# Patient Record
Sex: MALE | Race: WHITE | NOT HISPANIC OR LATINO | Employment: FULL TIME | URBAN - METROPOLITAN AREA
[De-identification: names, ages, dates, MRNs, and addresses within clinical notes are randomized per-mention and may not be internally consistent; named-entity substitution may affect disease eponyms.]

---

## 2021-06-12 ENCOUNTER — HOSPITAL ENCOUNTER (EMERGENCY)
Facility: CLINIC | Age: 31
Discharge: HOME | End: 2021-06-12
Attending: EMERGENCY MEDICINE | Admitting: EMERGENCY MEDICINE
Payer: COMMERCIAL

## 2021-06-12 ENCOUNTER — APPOINTMENT (OUTPATIENT)
Dept: MRI IMAGING | Facility: CLINIC | Age: 31
End: 2021-06-12
Attending: EMERGENCY MEDICINE
Payer: COMMERCIAL

## 2021-06-12 VITALS
HEART RATE: 66 BPM | SYSTOLIC BLOOD PRESSURE: 109 MMHG | DIASTOLIC BLOOD PRESSURE: 61 MMHG | TEMPERATURE: 97.6 F | RESPIRATION RATE: 16 BRPM | OXYGEN SATURATION: 97 % | WEIGHT: 180 LBS

## 2021-06-12 DIAGNOSIS — I63.81 LACUNAR INFARCTION (H): ICD-10-CM

## 2021-06-12 DIAGNOSIS — R51.9 ACUTE NONINTRACTABLE HEADACHE, UNSPECIFIED HEADACHE TYPE: ICD-10-CM

## 2021-06-12 DIAGNOSIS — H53.9 VISION CHANGES: ICD-10-CM

## 2021-06-12 LAB
CHOLEST SERPL-MCNC: 129 MG/DL
GLUCOSE BLDC GLUCOMTR-MCNC: 80 MG/DL (ref 70–99)
HBA1C MFR BLD: 5.2 % (ref 0–5.6)
HDLC SERPL-MCNC: 32 MG/DL
LDLC SERPL CALC-MCNC: 67 MG/DL
NONHDLC SERPL-MCNC: 97 MG/DL
TRIGL SERPL-MCNC: 149 MG/DL

## 2021-06-12 PROCEDURE — 250N000013 HC RX MED GY IP 250 OP 250 PS 637: Performed by: EMERGENCY MEDICINE

## 2021-06-12 PROCEDURE — 99285 EMERGENCY DEPT VISIT HI MDM: CPT | Mod: 25

## 2021-06-12 PROCEDURE — 255N000002 HC RX 255 OP 636: Performed by: EMERGENCY MEDICINE

## 2021-06-12 PROCEDURE — 80061 LIPID PANEL: CPT | Performed by: EMERGENCY MEDICINE

## 2021-06-12 PROCEDURE — 250N000011 HC RX IP 250 OP 636: Performed by: EMERGENCY MEDICINE

## 2021-06-12 PROCEDURE — 999N001017 HC STATISTIC GLUCOSE BY METER IP

## 2021-06-12 PROCEDURE — 83036 HEMOGLOBIN GLYCOSYLATED A1C: CPT | Performed by: EMERGENCY MEDICINE

## 2021-06-12 PROCEDURE — A9585 GADOBUTROL INJECTION: HCPCS | Performed by: EMERGENCY MEDICINE

## 2021-06-12 PROCEDURE — 70553 MRI BRAIN STEM W/O & W/DYE: CPT

## 2021-06-12 PROCEDURE — 96374 THER/PROPH/DIAG INJ IV PUSH: CPT | Mod: 59

## 2021-06-12 PROCEDURE — 96375 TX/PRO/DX INJ NEW DRUG ADDON: CPT

## 2021-06-12 PROCEDURE — 70543 MRI ORBT/FAC/NCK W/O &W/DYE: CPT

## 2021-06-12 RX ORDER — KETOROLAC TROMETHAMINE 15 MG/ML
15 INJECTION, SOLUTION INTRAMUSCULAR; INTRAVENOUS ONCE
Status: COMPLETED | OUTPATIENT
Start: 2021-06-12 | End: 2021-06-12

## 2021-06-12 RX ORDER — GADOBUTROL 604.72 MG/ML
10 INJECTION INTRAVENOUS ONCE
Status: COMPLETED | OUTPATIENT
Start: 2021-06-12 | End: 2021-06-12

## 2021-06-12 RX ORDER — DIPHENHYDRAMINE HYDROCHLORIDE 50 MG/ML
50 INJECTION INTRAMUSCULAR; INTRAVENOUS ONCE
Status: COMPLETED | OUTPATIENT
Start: 2021-06-12 | End: 2021-06-12

## 2021-06-12 RX ORDER — OXYCODONE HYDROCHLORIDE 5 MG/1
10 TABLET ORAL ONCE
Status: DISCONTINUED | OUTPATIENT
Start: 2021-06-12 | End: 2021-06-12

## 2021-06-12 RX ORDER — GADOBUTROL 604.72 MG/ML
10 INJECTION INTRAVENOUS ONCE
Status: DISCONTINUED | OUTPATIENT
Start: 2021-06-12 | End: 2021-06-12

## 2021-06-12 RX ORDER — METOCLOPRAMIDE HYDROCHLORIDE 5 MG/ML
10 INJECTION INTRAMUSCULAR; INTRAVENOUS ONCE
Status: COMPLETED | OUTPATIENT
Start: 2021-06-12 | End: 2021-06-12

## 2021-06-12 RX ORDER — ASPIRIN 325 MG
325 TABLET ORAL ONCE
Status: COMPLETED | OUTPATIENT
Start: 2021-06-12 | End: 2021-06-12

## 2021-06-12 RX ADMIN — GADOBUTROL 8 ML: 604.72 INJECTION INTRAVENOUS at 12:53

## 2021-06-12 RX ADMIN — KETOROLAC TROMETHAMINE 15 MG: 15 INJECTION, SOLUTION INTRAMUSCULAR; INTRAVENOUS at 14:56

## 2021-06-12 RX ADMIN — ASPIRIN 325 MG ORAL TABLET 325 MG: 325 PILL ORAL at 14:56

## 2021-06-12 RX ADMIN — METOCLOPRAMIDE HYDROCHLORIDE 10 MG: 5 INJECTION INTRAMUSCULAR; INTRAVENOUS at 14:17

## 2021-06-12 RX ADMIN — DIPHENHYDRAMINE HYDROCHLORIDE 50 MG: 50 INJECTION, SOLUTION INTRAMUSCULAR; INTRAVENOUS at 14:18

## 2021-06-12 ASSESSMENT — ENCOUNTER SYMPTOMS
NUMBNESS: 0
CHILLS: 0
WEAKNESS: 0
HEADACHES: 1
NECK PAIN: 0
PHOTOPHOBIA: 1
FEVER: 0
NAUSEA: 0
VOMITING: 0

## 2021-06-12 NOTE — DISCHARGE INSTRUCTIONS
Take a full 325mg aspirin daily.    Discharge Instructions  Headache    You were seen today for a headache. Headaches may be caused by many different things such as muscle tension, sinus inflammation, anxiety and stress, having too little sleep, too much alcohol, some medical conditions or injury. You may have a migraine, which is caused by changes in the blood vessels in your head.  At this time your provider does not find that your headache is a sign of anything dangerous or life-threatening.  However, sometimes the signs of serious illness do not show up right away.      Generally, every Emergency Department visit should have a follow-up clinic visit with either a primary or a specialty clinic/provider. Please follow-up as instructed by your emergency provider today.    Return to the Emergency Department if:  You get a new fever of 100.4 F or higher.  Your headache gets much worse.  You get a stiff neck with your headache.  You get a new headache that is significantly different or worse than headaches you have had before.  You are vomiting (throwing up) and cannot keep food or water down.  You have blurry or double vision or other problems with your eyes.  You have a new weakness on one side of your body.  You have difficulty with balance which is new.  You or your family thinks you are confused.  You have a seizure.    What can I do to help myself?  Pain medications - You may take a pain medication such as Tylenol  (acetaminophen), Advil , Motrin  (ibuprofen) or Aleve  (naproxen).  Take a pain reliever as soon as you notice symptoms.  Starting medications as soon as you start to have symptoms may lessen the amount of pain you have.  Relaxing in a quiet, dark room may help.  Get enough sleep and eat meals regularly.  You may need to watch for certain foods or other things which may trigger your headaches.  Keeping a journal of your headaches and possible triggers may help you and your primary provider to identify  things which you should avoid which may be causing your headaches.  If you were given a prescription for medicine here today, be sure to read all of the information (including the package insert) that comes with your prescription.  This will include important information about the medicine, its side effects, and any warnings that you need to know about.  The pharmacist who fills the prescription can provide more information and answer questions you may have about the medicine.  If you have questions or concerns that the pharmacist cannot address, please call or return to the Emergency Department.   Remember that you can always come back to the Emergency Department if you are not able to see your regular provider in the amount of time listed above, if you get any new symptoms, or if there is anything that worries you.

## 2021-06-12 NOTE — ED PROVIDER NOTES
History   Chief Complaint:  Headache       HPI   Wes Shahid is a 30 year old male who presents with headache . Symptoms have worsened over the past couple of weeks. He went to the dentist, since pain felt like it originated from his mouth. Notes left eye blurriness this morning and intermittent sensitivity to light. Denies fever, chills, nausea, vomiting, double vision, sensitivity to sounds, tinnitus, hearing loss, numbness, tingling, one sided weakness, and neck stiffness. He believes symptoms are worse when he wakes up. He used 800mg ibuprofen and 1000mg Tylenol with relief and rates his pain a 4. He is adopted, so he is unsure of his family medical history.     Review of Systems   Constitutional: Negative for chills and fever.   HENT: Negative for tinnitus.         Denies hearing loss.   Eyes: Positive for photophobia and visual disturbance.        Denies double vision.   Gastrointestinal: Negative for nausea and vomiting.   Musculoskeletal: Negative for neck pain.   Neurological: Positive for headaches. Negative for weakness and numbness.   All other systems reviewed and are negative.      Allergies:  Tetracycline  Minocycline    Medications:  None     Past Medical History:    Femoral neck fracture   Fibula fracture    Family History:  Adopted, FH unknown    Social History:  PCP: Clinic, [facility]  Presents with wife. Nonsmoker.     Physical Exam     Patient Vitals for the past 24 hrs:   BP Temp Temp src Pulse Resp SpO2 Weight   06/12/21 1410 -- -- -- -- -- 97 % --   06/12/21 1400 128/72 -- -- -- -- -- --   06/12/21 1240 -- -- -- -- -- 99 % --   06/12/21 1230 -- -- -- -- -- 100 % --   06/12/21 1220 101/58 -- -- -- -- -- --   06/12/21 0947 (!) 144/69 97.6  F (36.4  C) Temporal 79 16 99 % 81.6 kg (180 lb)       Physical Exam  General: Adult male sitting upright  Eyes: PERRL, Conjunctive within normal limits. EOMI. peripheral vision intact.  HENT: No scalp or facial or jaw tenderness to  palpation or percussion.  Moist mucous membranes, oropharynx clear.   Neck: No rigidity  CV: Normal S1S2, no murmur, rub or gallop. Regular rate and rhythm  Resp: Clear to auscultation bilaterally, no wheezes, rales or rhonchi. Normal respiratory effort.  MSK: No edema. Nontender. Normal active range of motion.  Skin: Warm and dry. No rashes or lesions or ecchymoses on visible skin.  Neuro: Alert and oriented. Responds appropriately to all questions and commands. No focal findings appreciated. Normal muscle tone.  No facial asymmetry.  Speech is normal.  Psych: Normal mood and affect. Pleasant.    Emergency Department Course     Imaging:  MR Orbit w/o & w Contrast  IMPRESSION: Negative orbit MR without and with contrast.  Reading per radiology    MR Brain w/o & w Contrast  IMPRESSION:  1. Small old lacunar infarct in the right caudate nucleus.  2. No evidence for intracranial hemorrhage, acute infarct, or any  focal mass lesions.  Reading per radiology    Laboratory:  Glucose by meter(1446): 80  Hemoglobin: A1c: pending   Lipid panel reflex to direct LDL: pending     Emergency Department Course:    Reviewed:  I reviewed nursing notes, vitals, past medical history and care everywhere    Assessments:  1125 I obtained history and examined the patient as noted above.   1435 I rechecked the patient and explained findings.     Consults:   1425 : I spoke with Dr. Gramajo of the Stroke Neurology service from [facility] regarding patient's presentation, findings, and plan of care.    Interventions:  1253 Gadavist 8mL IV   1417 Reglan 10mg IV   1418 Benadryl 50mg IV   1456 Toradol 15mg IV   1456 Aspirin 325 mg Oral      Disposition:  The patient was discharged to home.     Impression & Plan     Medical Decision Making:    Wes Shahid is a 30 year old male who presents with a headache associated today with intermittent left eye vision changes and photophobia.  Evaluation in the emergency department has been negative.  "The patient has not had any fever, weakness, numbness, neck stiffness, thunderclap, \"worst ever\" character, seizures, vision changes, trauma, or confusion. There is no evidence of increased ICP, vision changes have resolved and his presentation would be unlikely to be explained by this pathology.  SAH, stroke, cervical artery dissection, intracranial hemorrhage, meningitis/abscess, CVT, tumor, pseudotumor cerebri, pituitary apoplexy, glaucoma, temporal arteritis, CO poisoning are considered as part of the differential, and considered unlikely.  Migraine headache, tension type headache, cluster headache, male B etiologies.  MRI brain was obtained with MRI orbit given concerns for vision changes associated headache.  Incidentally this showed a chronic lunar infarct of which the patient was unaware and not having any knowledge of symptoms might have been associated this.  I discussed his care with stroke neurology who recommended daily aspirin and outpatient follow-up closely with primary care physician.  No indication for admission.  Pain has improved with medication interventions.  The patient should follow-up with primary physician within 3 days.  Outpatient neurology evaluation may be indicated if headaches persist.  If he develops any focal neurologic symptoms as described with him he should return immediate to the emergency department.  I recommended return for worse pain, fever, vomiting, weakness, vision changes, neck stiffness, or any other concerns.  He is recommended daily aspirin.        Diagnosis:    ICD-10-CM    1. Acute nonintractable headache, unspecified headache type  R51.9    2. Vision changes  H53.9     left eye   3. Lacunar infarction (H)  I63.81     chronic         Scribe Disclosure:  I, Magymavictor manuel Trevino, am serving as a scribe at 11:04 AM on 6/12/2021 to document services personally performed by Mona Saab MD based on my observations and the provider's statements to me.        Mona Saab " MD Laura  06/13/21 0718

## 2021-06-12 NOTE — ED TRIAGE NOTES
A&O x4, ABCs intact. Pt presents with headache for the past 2 weeks that is getting worse. Pt reports going to the dentist thinking he had dental pain but they didn't see anything. Pt took 800mg ibuprofen, 1g tylenol and 15mg of hydrocodone around 0800. Pt reports that the hydrocodone is from [place]. He states that helped.

## 2022-10-06 ENCOUNTER — HOSPITAL ENCOUNTER (EMERGENCY)
Facility: CLINIC | Age: 32
Discharge: HOME | End: 2022-10-06
Attending: EMERGENCY MEDICINE | Admitting: EMERGENCY MEDICINE
Payer: COMMERCIAL

## 2022-10-06 ENCOUNTER — APPOINTMENT (OUTPATIENT)
Dept: CT IMAGING | Facility: CLINIC | Age: 32
End: 2022-10-06
Attending: EMERGENCY MEDICINE
Payer: COMMERCIAL

## 2022-10-06 VITALS
DIASTOLIC BLOOD PRESSURE: 72 MMHG | SYSTOLIC BLOOD PRESSURE: 109 MMHG | OXYGEN SATURATION: 99 % | TEMPERATURE: 98.1 F | RESPIRATION RATE: 14 BRPM | HEART RATE: 91 BPM

## 2022-10-06 DIAGNOSIS — R51.9 NONINTRACTABLE HEADACHE, UNSPECIFIED CHRONICITY PATTERN, UNSPECIFIED HEADACHE TYPE: ICD-10-CM

## 2022-10-06 LAB
ANION GAP SERPL CALCULATED.3IONS-SCNC: 8 MMOL/L (ref 7–15)
BASOPHILS # BLD AUTO: 0 10E3/UL (ref 0–0.2)
BASOPHILS NFR BLD AUTO: 0 %
BUN SERPL-MCNC: 12.4 MG/DL (ref 6–20)
CALCIUM SERPL-MCNC: 8.9 MG/DL (ref 8.6–10)
CHLORIDE SERPL-SCNC: 105 MMOL/L (ref 98–107)
CREAT SERPL-MCNC: 0.68 MG/DL (ref 0.67–1.17)
DEPRECATED HCO3 PLAS-SCNC: 27 MMOL/L (ref 22–29)
EOSINOPHIL # BLD AUTO: 0.2 10E3/UL (ref 0–0.7)
EOSINOPHIL NFR BLD AUTO: 1 %
ERYTHROCYTE [DISTWIDTH] IN BLOOD BY AUTOMATED COUNT: 14 % (ref 10–15)
GFR SERPL CREATININE-BSD FRML MDRD: >90 ML/MIN/1.73M2
GLUCOSE SERPL-MCNC: 94 MG/DL (ref 70–99)
HCT VFR BLD AUTO: 44.4 % (ref 40–53)
HGB BLD-MCNC: 13.6 G/DL (ref 13.3–17.7)
IMM GRANULOCYTES # BLD: 0 10E3/UL
IMM GRANULOCYTES NFR BLD: 0 %
LYMPHOCYTES # BLD AUTO: 2.2 10E3/UL (ref 0.8–5.3)
LYMPHOCYTES NFR BLD AUTO: 18 %
MCH RBC QN AUTO: 25.4 PG (ref 26.5–33)
MCHC RBC AUTO-ENTMCNC: 30.6 G/DL (ref 31.5–36.5)
MCV RBC AUTO: 83 FL (ref 78–100)
MONOCYTES # BLD AUTO: 0.9 10E3/UL (ref 0–1.3)
MONOCYTES NFR BLD AUTO: 8 %
NEUTROPHILS # BLD AUTO: 9.1 10E3/UL (ref 1.6–8.3)
NEUTROPHILS NFR BLD AUTO: 73 %
NRBC # BLD AUTO: 0 10E3/UL
NRBC BLD AUTO-RTO: 0 /100
PLATELET # BLD AUTO: 442 10E3/UL (ref 150–450)
POTASSIUM SERPL-SCNC: 4.4 MMOL/L (ref 3.4–5.3)
RBC # BLD AUTO: 5.35 10E6/UL (ref 4.4–5.9)
SODIUM SERPL-SCNC: 140 MMOL/L (ref 136–145)
WBC # BLD AUTO: 12.5 10E3/UL (ref 4–11)

## 2022-10-06 PROCEDURE — 36415 COLL VENOUS BLD VENIPUNCTURE: CPT | Performed by: EMERGENCY MEDICINE

## 2022-10-06 PROCEDURE — 80048 BASIC METABOLIC PNL TOTAL CA: CPT | Performed by: EMERGENCY MEDICINE

## 2022-10-06 PROCEDURE — 85025 COMPLETE CBC W/AUTO DIFF WBC: CPT | Performed by: EMERGENCY MEDICINE

## 2022-10-06 PROCEDURE — 96375 TX/PRO/DX INJ NEW DRUG ADDON: CPT

## 2022-10-06 PROCEDURE — 250N000011 HC RX IP 250 OP 636: Performed by: EMERGENCY MEDICINE

## 2022-10-06 PROCEDURE — 96374 THER/PROPH/DIAG INJ IV PUSH: CPT

## 2022-10-06 PROCEDURE — 70450 CT HEAD/BRAIN W/O DYE: CPT

## 2022-10-06 PROCEDURE — 99285 EMERGENCY DEPT VISIT HI MDM: CPT | Mod: 25

## 2022-10-06 RX ORDER — DEXAMETHASONE SODIUM PHOSPHATE 10 MG/ML
10 INJECTION, SOLUTION INTRAMUSCULAR; INTRAVENOUS ONCE
Status: COMPLETED | OUTPATIENT
Start: 2022-10-06 | End: 2022-10-06

## 2022-10-06 RX ORDER — KETOROLAC TROMETHAMINE 15 MG/ML
15 INJECTION, SOLUTION INTRAMUSCULAR; INTRAVENOUS ONCE
Status: COMPLETED | OUTPATIENT
Start: 2022-10-06 | End: 2022-10-06

## 2022-10-06 RX ORDER — DIPHENHYDRAMINE HYDROCHLORIDE 50 MG/ML
25 INJECTION INTRAMUSCULAR; INTRAVENOUS ONCE
Status: COMPLETED | OUTPATIENT
Start: 2022-10-06 | End: 2022-10-06

## 2022-10-06 RX ADMIN — PROCHLORPERAZINE EDISYLATE 10 MG: 5 INJECTION INTRAMUSCULAR; INTRAVENOUS at 16:24

## 2022-10-06 RX ADMIN — DIPHENHYDRAMINE HYDROCHLORIDE 25 MG: 50 INJECTION, SOLUTION INTRAMUSCULAR; INTRAVENOUS at 16:12

## 2022-10-06 RX ADMIN — KETOROLAC TROMETHAMINE 15 MG: 15 INJECTION, SOLUTION INTRAMUSCULAR; INTRAVENOUS at 16:16

## 2022-10-06 RX ADMIN — DEXAMETHASONE SODIUM PHOSPHATE 10 MG: 10 INJECTION, SOLUTION INTRAMUSCULAR; INTRAVENOUS at 16:18

## 2022-10-06 ASSESSMENT — ENCOUNTER SYMPTOMS
PHOTOPHOBIA: 1
HEADACHES: 1
VOMITING: 1
NAUSEA: 1
NUMBNESS: 0
FEVER: 0
SPEECH DIFFICULTY: 0

## 2022-10-06 ASSESSMENT — ACTIVITIES OF DAILY LIVING (ADL)
ADLS_ACUITY_SCORE: 35
ADLS_ACUITY_SCORE: 35

## 2022-10-06 NOTE — ED PROVIDER NOTES
History     Chief Complaint:  Headache      HPI   Wes Shahid is a 32 year old male who presents with headaches. The patient has had a headache for the past month. He has been to his PCP for this and prescribed medications have not been helping him a lot. He is sensitive to natural light. The headaches usually is in the left frontal region, but radiates to his jaw line and/or the other side of his head when it worsens. He denies fever, but felt nauseous and vomited last night. He denies changes in speech or numbness/tingling. He notes a slight change in vision. He has an upcoming appointment with his neurologist on 10/21/2022.    Review of Systems   Constitutional: Negative for fever.   Eyes: Positive for photophobia and visual disturbance.   Gastrointestinal: Positive for nausea and vomiting.   Neurological: Positive for headaches. Negative for speech difficulty and numbness.   All other systems reviewed and are negative.    Allergies:  Tetracycline  Minocycline  Sumatriptan    Medications:    Metoclopramide    Past Medical History:    migraines    Social History:  Presents with SO    Physical Exam     Patient Vitals for the past 24 hrs:   BP Temp Temp src Pulse Resp SpO2   10/06/22 1630 113/68 -- -- -- 16 98 %   10/06/22 1341 127/86 98.1  F (36.7  C) Oral 91 18 98 %       Physical Exam  Constitutional: Alert, attentive  HENT:    Nose: Nose normal.    Mouth/Throat: Oropharynx is clear, mucous membranes are moist  Eyes: EOM are normal. Pupils are equal, round, and reactive to light.   CV: Regular rate and rhythm, no murmurs, rubs or gallops.  Chest: Effort normal and breath sounds normal.   GI: No distension. There is no tenderness  MSK: Normal range of motion.   Neurological:   A/Ox3;   Cranial nerves 2-12 intact;   5/5 strength throughout the upper and lower extremities;   sensation intact to light touch throughout the upper and lower extremities;   normal gait   No meningismus   Skin: Skin is warm and  dry.        Emergency Department Course     Imaging:  CT Head w/o Contrast   Final Result   IMPRESSION:    No acute intracranial abnormality.      DENZEL FARRELL MD            SYSTEM ID:  [id number]        Report per radiology    Laboratory:  Labs Ordered and Resulted from Time of ED Arrival to Time of ED Departure   CBC WITH PLATELETS AND DIFFERENTIAL - Abnormal       Result Value    WBC Count 12.5 (*)     RBC Count 5.35      Hemoglobin 13.6      Hematocrit 44.4      MCV 83      MCH 25.4 (*)     MCHC 30.6 (*)     RDW 14.0      Platelet Count 442      % Neutrophils 73      % Lymphocytes 18      % Monocytes 8      % Eosinophils 1      % Basophils 0      % Immature Granulocytes 0      NRBCs per 100 WBC 0      Absolute Neutrophils 9.1 (*)     Absolute Lymphocytes 2.2      Absolute Monocytes 0.9      Absolute Eosinophils 0.2      Absolute Basophils 0.0      Absolute Immature Granulocytes 0.0      Absolute NRBCs 0.0     BASIC METABOLIC PANEL - Normal    Sodium 140      Potassium 4.4      Chloride 105      Carbon Dioxide (CO2) 27      Anion Gap 8      Urea Nitrogen 12.4      Creatinine 0.68      Calcium 8.9      Glucose 94      GFR Estimate >90       Emergency Department Course:    Reviewed:  I reviewed nursing notes, vitals, past medical history and Care Everywhere    Assessments:  1345 I obtained history and examined the patient as noted above.   1655 I rechecked the patient and explained findings.     Interventions:  1612 Benadryl 25 mg IV  1616 toradol 15 mg IV  1618 Decadron 10 mg IV  1624 Compazine 10 mg IV    Disposition:  The patient was discharged to home.     Impression & Plan      Medical Decision Making:  This is a 32-year-old male with history of headaches, possibly migraines, who presents for evaluation of severe headache.  Approximately 1.5 years ago, the patient had his refractory headache for which she underwent MRI imaging which showed possible old lacunar infarct but no other acute abnormality.   Subsequently, he has had no significant issues with headaches.  He has a normal neurologic exam, but given change in character of headache in severity, CT was performed.  Imaging is fortunately normal.  Symptoms are improved with supportive cares.  There is no thunderclap aspect or fever to suggest LP at this time.  Plan neurology follow-up and continue supportive cares.  Return precautions for fever, stroke symptoms, or any other concerns.      Diagnosis:    ICD-10-CM    1. Nonintractable headache, unspecified chronicity pattern, unspecified headache type  R51.9          Scribe Disclosure:  Tim DALY, am serving as a scribe at 4:32 PM on 10/6/2022 to document services personally performed by Mane Chapman MD based on my observations and the provider's statements to me.      Mane Chapman MD  10/06/22 [id number]

## 2022-10-06 NOTE — LETTER
October 6, 2022      To Whom It May Concern:      Wes PRASAD Shahid was seen in our Emergency Department today, 10/06/22. Please excuse his absence from work.  He may return to work when improved.    Sincerely,        Jenna STEWART RN

## 2022-10-06 NOTE — ED NOTES
Rapid Assessment Note    History:   Wes Shahid is a 32 year old male who presents with headaches. The patient has had a headache for the past month. He has been to his PCP for this and prescribed medications have not been helping him a lot. He is sensitive to natural light. The headaches usually is in the left frontal region, but radiates to his jaw line and/or the other side of his head when it worsens. He denies fever, but felt nauseous and vomited last night. He denies changes in speech or numbness/tingling. He notes a slight change in vision. He has an upcoming appointment with his neurologist on 10/21/2022.    Exam:   General:  Alert, interactive  Cardiovascular:  Well perfused  Lungs:  No respiratory distress, no accessory muscle use  Neuro:  Moving all 4 extremities  Skin:  Warm, dry  Psych:  Normal affect  ***    Plan of Care:   I evaluated the patient and developed an initial plan of care. I discussed this plan and explained that I, or one of my partners, would be returning to complete the evaluation.     I, Tim Laytoned, am serving as a scribe to document services personally performed by Mane Chapman MD, based on my observations and the provider's statements to me.    10/6/2022  [employer]    Portions of this medical record were completed by a scribe. UPON MY REVIEW AND AUTHENTICATION BY ELECTRONIC SIGNATURE, this confirms (a) I performed the applicable clinical services, and (b) the record is accurate.

## 2025-06-14 ENCOUNTER — HOSPITAL ENCOUNTER (EMERGENCY)
Facility: CLINIC | Age: 35
Discharge: HOME | End: 2025-06-14
Attending: EMERGENCY MEDICINE | Admitting: EMERGENCY MEDICINE
Payer: COMMERCIAL

## 2025-06-14 ENCOUNTER — APPOINTMENT (OUTPATIENT)
Dept: CT IMAGING | Facility: CLINIC | Age: 35
End: 2025-06-14
Attending: EMERGENCY MEDICINE
Payer: COMMERCIAL

## 2025-06-14 VITALS
BODY MASS INDEX: 27.4 KG/M2 | HEART RATE: 73 BPM | HEIGHT: 69 IN | OXYGEN SATURATION: 97 % | DIASTOLIC BLOOD PRESSURE: 67 MMHG | RESPIRATION RATE: 18 BRPM | SYSTOLIC BLOOD PRESSURE: 114 MMHG | TEMPERATURE: 98.5 F | WEIGHT: 185 LBS

## 2025-06-14 DIAGNOSIS — R51.9 HEADACHE, UNSPECIFIED HEADACHE TYPE: ICD-10-CM

## 2025-06-14 LAB
ANION GAP SERPL CALCULATED.3IONS-SCNC: 12 MMOL/L (ref 7–15)
BASOPHILS # BLD AUTO: 0 10E3/UL (ref 0–0.2)
BASOPHILS NFR BLD AUTO: 0 %
BUN SERPL-MCNC: 14.7 MG/DL (ref 6–20)
CALCIUM SERPL-MCNC: 9.3 MG/DL (ref 8.8–10.4)
CHLORIDE SERPL-SCNC: 100 MMOL/L (ref 98–107)
CREAT SERPL-MCNC: 0.75 MG/DL (ref 0.67–1.17)
EGFRCR SERPLBLD CKD-EPI 2021: >90 ML/MIN/1.73M2
EOSINOPHIL # BLD AUTO: 0.1 10E3/UL (ref 0–0.7)
EOSINOPHIL NFR BLD AUTO: 1 %
ERYTHROCYTE [DISTWIDTH] IN BLOOD BY AUTOMATED COUNT: 13.6 % (ref 10–15)
GLUCOSE SERPL-MCNC: 90 MG/DL (ref 70–99)
HCO3 SERPL-SCNC: 26 MMOL/L (ref 22–29)
HCT VFR BLD AUTO: 48 % (ref 40–53)
HGB BLD-MCNC: 15.3 G/DL (ref 13.3–17.7)
HOLD SPECIMEN: NORMAL
HOLD SPECIMEN: NORMAL
IMM GRANULOCYTES # BLD: 0 10E3/UL
IMM GRANULOCYTES NFR BLD: 0 %
LYMPHOCYTES # BLD AUTO: 2.3 10E3/UL (ref 0.8–5.3)
LYMPHOCYTES NFR BLD AUTO: 20 %
MCH RBC QN AUTO: 25.9 PG (ref 26.5–33)
MCHC RBC AUTO-ENTMCNC: 31.9 G/DL (ref 31.5–36.5)
MCV RBC AUTO: 81 FL (ref 78–100)
MONOCYTES # BLD AUTO: 1.2 10E3/UL (ref 0–1.3)
MONOCYTES NFR BLD AUTO: 10 %
NEUTROPHILS # BLD AUTO: 8.1 10E3/UL (ref 1.6–8.3)
NEUTROPHILS NFR BLD AUTO: 69 %
NRBC # BLD AUTO: 0 10E3/UL
NRBC BLD AUTO-RTO: 0 /100
PLATELET # BLD AUTO: 440 10E3/UL (ref 150–450)
POTASSIUM SERPL-SCNC: 4.6 MMOL/L (ref 3.4–5.3)
RBC # BLD AUTO: 5.9 10E6/UL (ref 4.4–5.9)
SODIUM SERPL-SCNC: 138 MMOL/L (ref 135–145)
TSH SERPL DL<=0.005 MIU/L-ACNC: 1.6 UIU/ML (ref 0.3–4.2)
WBC # BLD AUTO: 11.8 10E3/UL (ref 4–11)

## 2025-06-14 PROCEDURE — 250N000009 HC RX 250: Performed by: EMERGENCY MEDICINE

## 2025-06-14 PROCEDURE — 80048 BASIC METABOLIC PNL TOTAL CA: CPT | Performed by: EMERGENCY MEDICINE

## 2025-06-14 PROCEDURE — 250N000011 HC RX IP 250 OP 636: Performed by: EMERGENCY MEDICINE

## 2025-06-14 PROCEDURE — 70450 CT HEAD/BRAIN W/O DYE: CPT

## 2025-06-14 PROCEDURE — 250N000011 HC RX IP 250 OP 636: Mod: JZ | Performed by: EMERGENCY MEDICINE

## 2025-06-14 PROCEDURE — 36415 COLL VENOUS BLD VENIPUNCTURE: CPT | Performed by: EMERGENCY MEDICINE

## 2025-06-14 PROCEDURE — 85025 COMPLETE CBC W/AUTO DIFF WBC: CPT | Performed by: EMERGENCY MEDICINE

## 2025-06-14 PROCEDURE — 70481 CT ORBIT/EAR/FOSSA W/DYE: CPT

## 2025-06-14 PROCEDURE — 96375 TX/PRO/DX INJ NEW DRUG ADDON: CPT

## 2025-06-14 PROCEDURE — 84443 ASSAY THYROID STIM HORMONE: CPT | Performed by: EMERGENCY MEDICINE

## 2025-06-14 PROCEDURE — 99285 EMERGENCY DEPT VISIT HI MDM: CPT | Mod: 25

## 2025-06-14 PROCEDURE — 96374 THER/PROPH/DIAG INJ IV PUSH: CPT | Mod: 59

## 2025-06-14 RX ORDER — IOPAMIDOL 755 MG/ML
67 INJECTION, SOLUTION INTRAVASCULAR ONCE
Status: COMPLETED | OUTPATIENT
Start: 2025-06-14 | End: 2025-06-14

## 2025-06-14 RX ORDER — TETRACAINE HYDROCHLORIDE 5 MG/ML
2 SOLUTION OPHTHALMIC ONCE
Status: COMPLETED | OUTPATIENT
Start: 2025-06-14 | End: 2025-06-14

## 2025-06-14 RX ORDER — METOCLOPRAMIDE HYDROCHLORIDE 5 MG/ML
10 INJECTION INTRAMUSCULAR; INTRAVENOUS ONCE
Status: COMPLETED | OUTPATIENT
Start: 2025-06-14 | End: 2025-06-14

## 2025-06-14 RX ORDER — KETOROLAC TROMETHAMINE 15 MG/ML
15 INJECTION, SOLUTION INTRAMUSCULAR; INTRAVENOUS ONCE
Status: COMPLETED | OUTPATIENT
Start: 2025-06-14 | End: 2025-06-14

## 2025-06-14 RX ORDER — BUPIVACAINE HYDROCHLORIDE 5 MG/ML
5 INJECTION, SOLUTION EPIDURAL; INTRACAUDAL; PERINEURAL ONCE
Status: DISCONTINUED | OUTPATIENT
Start: 2025-06-14 | End: 2025-06-14 | Stop reason: HOSPADM

## 2025-06-14 RX ADMIN — TETRACAINE HYDROCHLORIDE 2 DROP: 5 SOLUTION OPHTHALMIC at 15:10

## 2025-06-14 RX ADMIN — METOCLOPRAMIDE 10 MG: 5 INJECTION, SOLUTION INTRAMUSCULAR; INTRAVENOUS at 12:48

## 2025-06-14 RX ADMIN — KETOROLAC TROMETHAMINE 15 MG: 15 INJECTION, SOLUTION INTRAMUSCULAR; INTRAVENOUS at 12:46

## 2025-06-14 RX ADMIN — IOPAMIDOL 67 ML: 755 INJECTION, SOLUTION INTRAVENOUS at 13:51

## 2025-06-14 ASSESSMENT — ACTIVITIES OF DAILY LIVING (ADL)
ADLS_ACUITY_SCORE: 41

## 2025-06-14 ASSESSMENT — VISUAL ACUITY
OU: NORMAL
OD: 20/25;WITHOUT CORRECTIVE LENSES
OS: 20/30;WITHOUT CORRECTIVE LENSES

## 2025-06-14 ASSESSMENT — COLUMBIA-SUICIDE SEVERITY RATING SCALE - C-SSRS
6. HAVE YOU EVER DONE ANYTHING, STARTED TO DO ANYTHING, OR PREPARED TO DO ANYTHING TO END YOUR LIFE?: NO
2. HAVE YOU ACTUALLY HAD ANY THOUGHTS OF KILLING YOURSELF IN THE PAST MONTH?: NO
1. IN THE PAST MONTH, HAVE YOU WISHED YOU WERE DEAD OR WISHED YOU COULD GO TO SLEEP AND NOT WAKE UP?: NO

## 2025-06-14 NOTE — ED TRIAGE NOTES
"Pt presents for evaluation of upper left tooth pain since last week. Feels it's a \"failed root canal\". Called the dentist last week and was given Augmentin. Last time pt had symptoms, they improved after a couple days of abx. This time, no improvement. Other symptoms include left cheek swelling, a sensation that left eye is protruding, headache on left side of head and left sinus sinus pressure. Denies any drainage, foul taste or smell. Has an appointment with dentist on 6/21. Took Advil at 0530.        "

## 2025-06-14 NOTE — ED PROVIDER NOTES
"    History     Chief Complaint:  Dental Pain       HPI   Wes Shahid is a 34 year old male with no significant past medical history who presents emergency department with a headache behind his left eye that is throbbing, waxing and waning in severity.  Reports that he had similar symptoms when he had a dental infection in the past.  He called his dental clinic and was prescribed Augmentin on 6/11.  He has had 6 doses of this.  Headache was not thunderclap in nature, no associated fever or vomiting or acute neck pain.  Reports some slight dizziness intermittently when he gets up.  Reports some left eye tearing, blurry vision on and off, but denies any changes in speech, weakness of the arms or legs, recent trauma, falls, chiropractor manipulation, cough or recent URI symptoms.  Does have some slight dental pain.  He reports that his mom who is a nurse thought the left side of his face was swollen and that he had \"exophthalmos\".      Independent Historian:    None    Review of External Notes:  Medicine note reviewed from 9/28/2022, patient was seen for headache for the past 3 weeks, has a history of migraines    Medications:    No current outpatient medications on file.      Past Medical History:    No past medical history on file.    Past Surgical History:    No past surgical history on file.       Physical Exam   Patient Vitals for the past 24 hrs:   BP Temp Temp src Pulse Resp SpO2 Height Weight   06/14/25 1439 114/67 -- -- 73 -- 97 % -- --   06/14/25 1300 (!) 120/92 -- -- 82 -- -- -- --   06/14/25 1203 (!) 142/90 98.5  F (36.9  C) Oral 93 18 100 % 1.753 m (5' 9\") 83.9 kg (185 lb)        Physical Exam  GENERAL: Awake, alert  Eye: Left intraocular pressures 10  ENT: Patient has mild tenderness to palpation of the left first maxillary premolar, no associated periapical abscess, no obvious swelling, patient does have some pain when moving his left eye laterally and upward, no diplopia, no facial swelling or " periorbital swelling or exophthalmos  Neck: No meningismus, patient has palpable, firm, 1 cm nodular area in the left posterior neck that is mildly tender to palpation no overlying erythema  NEUROLOGICAL: Patient is awake, face is symmetric, tongue is midline, extraocular muscles intact, no dysarthria, no dysmetria on finger-to-nose, 5 out of 5 strength throughout and sensation is normal, normal gait        Emergency Department Course     Imaging:  CT Orbits w Contrast   Final Result   IMPRESSION:    1.  Negative CT of the orbits.      Head CT w/o contrast   Final Result   IMPRESSION:     1.  No evidence of acute intracranial hemorrhage or mass effect.          Laboratory:  Labs Ordered and Resulted from Time of ED Arrival to Time of ED Departure   CBC WITH PLATELETS AND DIFFERENTIAL - Abnormal       Result Value    WBC Count 11.8 (*)     RBC Count 5.90      Hemoglobin 15.3      Hematocrit 48.0      MCV 81      MCH 25.9 (*)     MCHC 31.9      RDW 13.6      Platelet Count 440      % Neutrophils 69      % Lymphocytes 20      % Monocytes 10      % Eosinophils 1      % Basophils 0      % Immature Granulocytes 0      NRBCs per 100 WBC 0      Absolute Neutrophils 8.1      Absolute Lymphocytes 2.3      Absolute Monocytes 1.2      Absolute Eosinophils 0.1      Absolute Basophils 0.0      Absolute Immature Granulocytes 0.0      Absolute NRBCs 0.0     BASIC METABOLIC PANEL - Normal    Sodium 138      Potassium 4.6      Chloride 100      Carbon Dioxide (CO2) 26      Anion Gap 12      Urea Nitrogen 14.7      Creatinine 0.75      GFR Estimate >90      Calcium 9.3      Glucose 90     TSH WITH FREE T4 REFLEX - Normal    TSH 1.60          Procedures       Emergency Department Course & Assessments:    Interventions:  Medications   ketorolac (TORADOL) injection 15 mg (15 mg Intravenous $Given 6/14/25 1246)   metoclopramide (REGLAN) injection 10 mg (10 mg Intravenous $Given 6/14/25 1248)   iopamidol (ISOVUE-370) solution 67 mL (67 mLs  Intravenous $Given 25 1351)   sodium chloride (PF) 0.9% PF flush 65 mL (65 mLs Intravenous $Given 25 1351)   tetracaine (PONTOCAINE) 0.5 % ophthalmic solution 2 drop (2 drops Both Eyes $Given by Other Clinician 25 1510)        Assessments:    Independent interpretation:   Reviewed CT head: No intracranial hemorrhage       Disposition:  The patient was discharged.    Impression & Plan      Medical Decision Makin-year-old male who presents for evaluation of gradual onset left-sided headache located behind his left eye, also some slight dental pain, some report of left facial swelling although there is no swelling on exam and he has been on antibiotics for several days for dental infection.  No periapical abscess on exam, neurological exam was completely nonfocal, he had no meningismus or fever.  On neurological exam, he did have pain moving his left eye laterally and upwards.  This is quite unusual, considered orbital cellulitis, and given that the patient has been on antibiotics, I did perform a CT of the orbits which was unremarkable.  He has normal intraocular pressure.  His visual acuity was 20/30 on the left and 20/25 on the right.  He is feeling much better with improvement in his headache to 2 out of 10 after above medications.  Considered cluster headache with the location and the tearing of the left eye, did also apply oxygen therapy without much improvement.  CT head was also unremarkable.  Given normal neurological exam do not believe that emergent MRI is warranted.  I suspect that this is less likely to be a dental infection but he can follow closely with his dentist, will continue the Augmentin, will return if worsening    Diagnosis:    ICD-10-CM    1. Headache, unspecified headache type  R51.9            Discharge Medications:  There are no discharge medications for this patient.        Haley Isaac MD  25 [id number]

## 2025-06-20 ENCOUNTER — LAB REQUISITION (OUTPATIENT)
Dept: LAB | Facility: CLINIC | Age: 35
End: 2025-06-20
Payer: COMMERCIAL

## 2025-06-20 DIAGNOSIS — G50.9 DISORDER OF TRIGEMINAL NERVE, UNSPECIFIED: ICD-10-CM

## 2025-06-20 PROCEDURE — 87077 CULTURE AEROBIC IDENTIFY: CPT | Mod: ORL | Performed by: STUDENT IN AN ORGANIZED HEALTH CARE EDUCATION/TRAINING PROGRAM

## 2025-06-20 PROCEDURE — 87102 FUNGUS ISOLATION CULTURE: CPT | Mod: ORL | Performed by: STUDENT IN AN ORGANIZED HEALTH CARE EDUCATION/TRAINING PROGRAM

## 2025-06-22 LAB
BACTERIA SPEC CULT: ABNORMAL
BACTERIA SPEC CULT: ABNORMAL

## 2025-06-26 LAB — BACTERIA SPEC CULT: NORMAL

## 2025-07-03 LAB — BACTERIA SPEC CULT: NORMAL

## 2025-07-04 LAB — BACTERIA SPEC CULT: NO GROWTH
